# Patient Record
Sex: FEMALE | Race: WHITE | NOT HISPANIC OR LATINO | URBAN - METROPOLITAN AREA
[De-identification: names, ages, dates, MRNs, and addresses within clinical notes are randomized per-mention and may not be internally consistent; named-entity substitution may affect disease eponyms.]

---

## 2017-11-04 ENCOUNTER — OUTPATIENT (OUTPATIENT)
Dept: OUTPATIENT SERVICES | Facility: HOSPITAL | Age: 57
LOS: 1 days | Discharge: HOME | End: 2017-11-04

## 2017-11-04 DIAGNOSIS — Z12.31 ENCOUNTER FOR SCREENING MAMMOGRAM FOR MALIGNANT NEOPLASM OF BREAST: ICD-10-CM

## 2018-04-01 PROBLEM — Z00.00 ENCOUNTER FOR PREVENTIVE HEALTH EXAMINATION: Status: ACTIVE | Noted: 2018-04-01

## 2018-05-01 ENCOUNTER — LABORATORY RESULT (OUTPATIENT)
Age: 58
End: 2018-05-01

## 2018-05-01 ENCOUNTER — APPOINTMENT (OUTPATIENT)
Dept: OBGYN | Facility: CLINIC | Age: 58
End: 2018-05-01
Payer: COMMERCIAL

## 2018-05-01 ENCOUNTER — OUTPATIENT (OUTPATIENT)
Dept: OUTPATIENT SERVICES | Facility: HOSPITAL | Age: 58
LOS: 1 days | Discharge: HOME | End: 2018-05-01

## 2018-05-01 VITALS — HEIGHT: 63 IN | BODY MASS INDEX: 23.21 KG/M2 | WEIGHT: 131 LBS

## 2018-05-01 DIAGNOSIS — E10.39 TYPE 1 DIABETES MELLITUS WITH OTHER DIABETIC OPHTHALMIC COMPLICATION: ICD-10-CM

## 2018-05-01 PROCEDURE — 81003 URINALYSIS AUTO W/O SCOPE: CPT | Mod: QW

## 2018-05-01 PROCEDURE — 99396 PREV VISIT EST AGE 40-64: CPT

## 2018-05-05 PROBLEM — E10.39 TYPE 1 DIABETES MELLITUS WITH OTHER OPHTHALMIC COMPLICATION: Status: ACTIVE | Noted: 2018-05-05

## 2018-05-06 LAB
BILIRUB UR QL STRIP: NORMAL
GLUCOSE UR-MCNC: NORMAL
HCG UR QL: NORMAL EU/DL
HGB UR QL STRIP.AUTO: NORMAL
KETONES UR-MCNC: NORMAL
LEUKOCYTE ESTERASE UR QL STRIP: 25
NITRITE UR QL STRIP: NORMAL
PH UR STRIP: 7
PROT UR STRIP-MCNC: NORMAL
SP GR UR STRIP: 1

## 2018-05-08 DIAGNOSIS — Z01.419 ENCOUNTER FOR GYNECOLOGICAL EXAMINATION (GENERAL) (ROUTINE) WITHOUT ABNORMAL FINDINGS: ICD-10-CM

## 2018-06-05 ENCOUNTER — OUTPATIENT (OUTPATIENT)
Dept: OUTPATIENT SERVICES | Facility: HOSPITAL | Age: 58
LOS: 1 days | Discharge: HOME | End: 2018-06-05

## 2018-06-05 DIAGNOSIS — H90.3 SENSORINEURAL HEARING LOSS, BILATERAL: ICD-10-CM

## 2018-06-15 ENCOUNTER — FORM ENCOUNTER (OUTPATIENT)
Age: 58
End: 2018-06-15

## 2018-06-16 ENCOUNTER — OUTPATIENT (OUTPATIENT)
Dept: OUTPATIENT SERVICES | Facility: HOSPITAL | Age: 58
LOS: 1 days | Discharge: HOME | End: 2018-06-16

## 2018-06-18 DIAGNOSIS — M89.9 DISORDER OF BONE, UNSPECIFIED: ICD-10-CM

## 2018-06-18 DIAGNOSIS — Z13.820 ENCOUNTER FOR SCREENING FOR OSTEOPOROSIS: ICD-10-CM

## 2018-06-18 DIAGNOSIS — Z78.0 ASYMPTOMATIC MENOPAUSAL STATE: ICD-10-CM

## 2018-07-09 ENCOUNTER — APPOINTMENT (OUTPATIENT)
Dept: OTOLARYNGOLOGY | Facility: CLINIC | Age: 58
End: 2018-07-09
Payer: COMMERCIAL

## 2018-07-09 DIAGNOSIS — H91.90 UNSPECIFIED HEARING LOSS, UNSPECIFIED EAR: ICD-10-CM

## 2018-07-09 DIAGNOSIS — Z78.9 OTHER SPECIFIED HEALTH STATUS: ICD-10-CM

## 2018-07-09 PROCEDURE — 92550 TYMPANOMETRY & REFLEX THRESH: CPT

## 2018-07-09 PROCEDURE — 92557 COMPREHENSIVE HEARING TEST: CPT

## 2018-07-09 PROCEDURE — 99203 OFFICE O/P NEW LOW 30 MIN: CPT | Mod: 25

## 2018-07-09 RX ORDER — ESCITALOPRAM OXALATE 10 MG/1
10 TABLET, FILM COATED ORAL
Refills: 0 | Status: ACTIVE | COMMUNITY

## 2018-11-16 ENCOUNTER — FORM ENCOUNTER (OUTPATIENT)
Age: 58
End: 2018-11-16

## 2018-11-17 ENCOUNTER — OUTPATIENT (OUTPATIENT)
Dept: OUTPATIENT SERVICES | Facility: HOSPITAL | Age: 58
LOS: 1 days | Discharge: HOME | End: 2018-11-17

## 2018-11-17 DIAGNOSIS — Z12.31 ENCOUNTER FOR SCREENING MAMMOGRAM FOR MALIGNANT NEOPLASM OF BREAST: ICD-10-CM

## 2019-05-07 ENCOUNTER — LABORATORY RESULT (OUTPATIENT)
Age: 59
End: 2019-05-07

## 2019-05-07 ENCOUNTER — OUTPATIENT (OUTPATIENT)
Dept: OUTPATIENT SERVICES | Facility: HOSPITAL | Age: 59
LOS: 1 days | Discharge: HOME | End: 2019-05-07

## 2019-05-07 ENCOUNTER — APPOINTMENT (OUTPATIENT)
Dept: OBGYN | Facility: CLINIC | Age: 59
End: 2019-05-07
Payer: COMMERCIAL

## 2019-05-07 VITALS — WEIGHT: 130 LBS | HEIGHT: 63 IN | BODY MASS INDEX: 23.04 KG/M2

## 2019-05-07 LAB
BILIRUB UR QL STRIP: NORMAL
GLUCOSE UR-MCNC: NORMAL
HCG UR QL: NORMAL EU/DL
HGB UR QL STRIP.AUTO: NORMAL
KETONES UR-MCNC: NORMAL
LEUKOCYTE ESTERASE UR QL STRIP: 25
NITRITE UR QL STRIP: NORMAL
PH UR STRIP: 6.5
PROT UR STRIP-MCNC: NORMAL
SP GR UR STRIP: 1.01

## 2019-05-07 PROCEDURE — 99396 PREV VISIT EST AGE 40-64: CPT

## 2019-05-09 DIAGNOSIS — Z01.419 ENCOUNTER FOR GYNECOLOGICAL EXAMINATION (GENERAL) (ROUTINE) WITHOUT ABNORMAL FINDINGS: ICD-10-CM

## 2019-11-22 ENCOUNTER — FORM ENCOUNTER (OUTPATIENT)
Age: 59
End: 2019-11-22

## 2019-11-23 ENCOUNTER — OUTPATIENT (OUTPATIENT)
Dept: OUTPATIENT SERVICES | Facility: HOSPITAL | Age: 59
LOS: 1 days | Discharge: HOME | End: 2019-11-23
Payer: COMMERCIAL

## 2019-11-23 ENCOUNTER — OUTPATIENT (OUTPATIENT)
Dept: OUTPATIENT SERVICES | Facility: HOSPITAL | Age: 59
LOS: 1 days | Discharge: HOME | End: 2019-11-23

## 2019-11-23 DIAGNOSIS — Z12.31 ENCOUNTER FOR SCREENING MAMMOGRAM FOR MALIGNANT NEOPLASM OF BREAST: ICD-10-CM

## 2019-11-23 DIAGNOSIS — Z00.00 ENCOUNTER FOR GENERAL ADULT MEDICAL EXAMINATION WITHOUT ABNORMAL FINDINGS: ICD-10-CM

## 2019-11-23 PROCEDURE — 77067 SCR MAMMO BI INCL CAD: CPT | Mod: 26

## 2019-11-23 PROCEDURE — 77063 BREAST TOMOSYNTHESIS BI: CPT | Mod: 26

## 2020-09-07 ENCOUNTER — LABORATORY RESULT (OUTPATIENT)
Age: 60
End: 2020-09-07

## 2020-09-08 ENCOUNTER — APPOINTMENT (OUTPATIENT)
Dept: OBGYN | Facility: CLINIC | Age: 60
End: 2020-09-08
Payer: COMMERCIAL

## 2020-09-08 VITALS — HEIGHT: 63 IN | TEMPERATURE: 97.8 F | WEIGHT: 127 LBS | BODY MASS INDEX: 22.5 KG/M2

## 2020-09-08 LAB
BILIRUB UR QL STRIP: NORMAL
CLARITY UR: CLEAR
GLUCOSE UR-MCNC: NORMAL
HCG UR QL: 0.2 EU/DL
HGB UR QL STRIP.AUTO: NORMAL
KETONES UR-MCNC: NORMAL
LEUKOCYTE ESTERASE UR QL STRIP: NORMAL
NITRITE UR QL STRIP: NORMAL
PH UR STRIP: 6.5
PROT UR STRIP-MCNC: NORMAL
SP GR UR STRIP: 1

## 2020-09-08 PROCEDURE — 99396 PREV VISIT EST AGE 40-64: CPT

## 2020-09-08 PROCEDURE — 81003 URINALYSIS AUTO W/O SCOPE: CPT | Mod: QW

## 2020-11-25 ENCOUNTER — OUTPATIENT (OUTPATIENT)
Dept: OUTPATIENT SERVICES | Facility: HOSPITAL | Age: 60
LOS: 1 days | Discharge: HOME | End: 2020-11-25

## 2020-11-27 DIAGNOSIS — Z11.59 ENCOUNTER FOR SCREENING FOR OTHER VIRAL DISEASES: ICD-10-CM

## 2020-12-12 ENCOUNTER — OUTPATIENT (OUTPATIENT)
Dept: OUTPATIENT SERVICES | Facility: HOSPITAL | Age: 60
LOS: 1 days | Discharge: HOME | End: 2020-12-12
Payer: COMMERCIAL

## 2020-12-12 ENCOUNTER — RESULT REVIEW (OUTPATIENT)
Age: 60
End: 2020-12-12

## 2020-12-12 DIAGNOSIS — Z12.31 ENCOUNTER FOR SCREENING MAMMOGRAM FOR MALIGNANT NEOPLASM OF BREAST: ICD-10-CM

## 2020-12-12 PROCEDURE — 77067 SCR MAMMO BI INCL CAD: CPT | Mod: 26

## 2020-12-12 PROCEDURE — 77063 BREAST TOMOSYNTHESIS BI: CPT | Mod: 26

## 2020-12-14 DIAGNOSIS — M89.9 DISORDER OF BONE, UNSPECIFIED: ICD-10-CM

## 2020-12-14 DIAGNOSIS — Z78.0 ASYMPTOMATIC MENOPAUSAL STATE: ICD-10-CM

## 2020-12-14 DIAGNOSIS — Z13.820 ENCOUNTER FOR SCREENING FOR OSTEOPOROSIS: ICD-10-CM

## 2021-01-27 ENCOUNTER — OUTPATIENT (OUTPATIENT)
Dept: OUTPATIENT SERVICES | Facility: HOSPITAL | Age: 61
LOS: 1 days | Discharge: HOME | End: 2021-01-27

## 2021-01-28 DIAGNOSIS — Z11.59 ENCOUNTER FOR SCREENING FOR OTHER VIRAL DISEASES: ICD-10-CM

## 2021-02-06 ENCOUNTER — OUTPATIENT (OUTPATIENT)
Dept: OUTPATIENT SERVICES | Facility: HOSPITAL | Age: 61
LOS: 1 days | Discharge: HOME | End: 2021-02-06

## 2021-02-06 DIAGNOSIS — Z11.59 ENCOUNTER FOR SCREENING FOR OTHER VIRAL DISEASES: ICD-10-CM

## 2021-04-13 ENCOUNTER — APPOINTMENT (OUTPATIENT)
Dept: OBGYN | Facility: CLINIC | Age: 61
End: 2021-04-13

## 2021-04-22 ENCOUNTER — NON-APPOINTMENT (OUTPATIENT)
Age: 61
End: 2021-04-22

## 2021-06-07 ENCOUNTER — APPOINTMENT (OUTPATIENT)
Dept: OBGYN | Facility: CLINIC | Age: 61
End: 2021-06-07

## 2021-08-23 ENCOUNTER — APPOINTMENT (OUTPATIENT)
Dept: OTOLARYNGOLOGY | Facility: CLINIC | Age: 61
End: 2021-08-23
Payer: COMMERCIAL

## 2021-08-23 DIAGNOSIS — J35.8 OTHER CHRONIC DISEASES OF TONSILS AND ADENOIDS: ICD-10-CM

## 2021-08-23 PROCEDURE — 31575 DIAGNOSTIC LARYNGOSCOPY: CPT

## 2021-08-23 PROCEDURE — 99213 OFFICE O/P EST LOW 20 MIN: CPT | Mod: 25

## 2021-08-23 NOTE — PHYSICAL EXAM
[Normal] : mucosa is normal [Midline] : trachea located in midline position [de-identified] : ~0.5 cm left tonsilar mass

## 2021-08-23 NOTE — HISTORY OF PRESENT ILLNESS
[de-identified] : 57 Year old patient is here today for hearing aid clearance. Patient complains of hearing loss bilaterally that has been present for years. She recently had an audiogram completed which states she requires hearing aids. She is otherwise well and denies otalgia and/or otorrhea.  [FreeTextEntry1] : \par 8/23/21: Patient presents today with c/o left tonsillar mass. Patient states she felt a lump on the left side of her neck. Seen by primary today told to have tonsillar mass. Patient denies any sore throat. No dysphagia. No choking. Unsure of snoring at night.

## 2021-09-09 ENCOUNTER — OUTPATIENT (OUTPATIENT)
Dept: OUTPATIENT SERVICES | Facility: HOSPITAL | Age: 61
LOS: 1 days | Discharge: HOME | End: 2021-09-09
Payer: COMMERCIAL

## 2021-09-09 ENCOUNTER — RESULT REVIEW (OUTPATIENT)
Age: 61
End: 2021-09-09

## 2021-09-09 DIAGNOSIS — J35.8 OTHER CHRONIC DISEASES OF TONSILS AND ADENOIDS: ICD-10-CM

## 2021-09-09 PROCEDURE — 70543 MRI ORBT/FAC/NCK W/O &W/DYE: CPT | Mod: 26

## 2021-09-14 ENCOUNTER — TRANSCRIPTION ENCOUNTER (OUTPATIENT)
Age: 61
End: 2021-09-14

## 2021-09-24 ENCOUNTER — APPOINTMENT (OUTPATIENT)
Dept: OPHTHALMOLOGY | Facility: CLINIC | Age: 61
End: 2021-09-24

## 2021-09-24 ENCOUNTER — OUTPATIENT (OUTPATIENT)
Dept: OUTPATIENT SERVICES | Facility: HOSPITAL | Age: 61
LOS: 1 days | Discharge: HOME | End: 2021-09-24
Payer: COMMERCIAL

## 2021-09-24 DIAGNOSIS — H04.129 DRY EYE SYNDROME OF UNSPECIFIED LACRIMAL GLAND: ICD-10-CM

## 2021-09-24 PROCEDURE — 92004 COMPRE OPH EXAM NEW PT 1/>: CPT

## 2021-10-13 ENCOUNTER — APPOINTMENT (OUTPATIENT)
Dept: OTOLARYNGOLOGY | Facility: CLINIC | Age: 61
End: 2021-10-13

## 2021-12-14 ENCOUNTER — RESULT REVIEW (OUTPATIENT)
Age: 61
End: 2021-12-14

## 2021-12-14 ENCOUNTER — OUTPATIENT (OUTPATIENT)
Dept: OUTPATIENT SERVICES | Facility: HOSPITAL | Age: 61
LOS: 1 days | Discharge: HOME | End: 2021-12-14
Payer: COMMERCIAL

## 2021-12-14 DIAGNOSIS — Z12.31 ENCOUNTER FOR SCREENING MAMMOGRAM FOR MALIGNANT NEOPLASM OF BREAST: ICD-10-CM

## 2021-12-14 PROCEDURE — 77067 SCR MAMMO BI INCL CAD: CPT | Mod: 26

## 2021-12-14 PROCEDURE — 77063 BREAST TOMOSYNTHESIS BI: CPT | Mod: 26

## 2022-02-28 ENCOUNTER — LABORATORY RESULT (OUTPATIENT)
Age: 62
End: 2022-02-28

## 2022-03-01 ENCOUNTER — APPOINTMENT (OUTPATIENT)
Dept: OBGYN | Facility: CLINIC | Age: 62
End: 2022-03-01
Payer: COMMERCIAL

## 2022-03-01 VITALS — WEIGHT: 126 LBS | BODY MASS INDEX: 22.32 KG/M2 | HEIGHT: 63 IN | TEMPERATURE: 97 F

## 2022-03-01 DIAGNOSIS — N94.10 UNSPECIFIED DYSPAREUNIA: ICD-10-CM

## 2022-03-01 PROCEDURE — 99396 PREV VISIT EST AGE 40-64: CPT

## 2022-04-29 ENCOUNTER — APPOINTMENT (OUTPATIENT)
Dept: OTOLARYNGOLOGY | Facility: CLINIC | Age: 62
End: 2022-04-29
Payer: COMMERCIAL

## 2022-04-29 DIAGNOSIS — H91.93 UNSPECIFIED HEARING LOSS, BILATERAL: ICD-10-CM

## 2022-04-29 DIAGNOSIS — T16.9XXA FOREIGN BODY IN EAR, UNSPECIFIED EAR, INITIAL ENCOUNTER: ICD-10-CM

## 2022-04-29 PROCEDURE — 69200 CLEAR OUTER EAR CANAL: CPT

## 2022-04-29 PROCEDURE — 99213 OFFICE O/P EST LOW 20 MIN: CPT | Mod: 25

## 2022-04-29 PROCEDURE — 92557 COMPREHENSIVE HEARING TEST: CPT

## 2022-04-29 PROCEDURE — 92550 TYMPANOMETRY & REFLEX THRESH: CPT

## 2022-04-29 NOTE — HISTORY OF PRESENT ILLNESS
[FreeTextEntry1] : Patient presents today following up on foreign body in left ear.   Has a  piece of  hearing aid stuck in left .  Has been in ear since April 7th. No ear pain.

## 2022-04-29 NOTE — ASSESSMENT
[FreeTextEntry1] : Foreign body removed.\par \par I reviewed, interpreted, and discussed the Audiogram done today. Bilateral SNHL.\par

## 2022-04-29 NOTE — PHYSICAL EXAM
[Normal] : mucosa is normal [Midline] : trachea located in midline position [de-identified] : fb left EAC, consistent with hearing aid piece, removed uneventfully with alligator forceps.  [de-identified] : post pharngeal lesion, smooth in appearance above left tonsil approx 5 mm x 5 mm

## 2022-12-17 ENCOUNTER — RESULT REVIEW (OUTPATIENT)
Age: 62
End: 2022-12-17

## 2022-12-17 ENCOUNTER — OUTPATIENT (OUTPATIENT)
Dept: OUTPATIENT SERVICES | Facility: HOSPITAL | Age: 62
LOS: 1 days | Discharge: HOME | End: 2022-12-17

## 2022-12-17 DIAGNOSIS — Z12.31 ENCOUNTER FOR SCREENING MAMMOGRAM FOR MALIGNANT NEOPLASM OF BREAST: ICD-10-CM

## 2022-12-17 PROCEDURE — 77067 SCR MAMMO BI INCL CAD: CPT | Mod: 26

## 2022-12-17 PROCEDURE — 77063 BREAST TOMOSYNTHESIS BI: CPT | Mod: 26

## 2023-03-06 ENCOUNTER — LABORATORY RESULT (OUTPATIENT)
Age: 63
End: 2023-03-06

## 2023-03-07 ENCOUNTER — APPOINTMENT (OUTPATIENT)
Dept: OBGYN | Facility: CLINIC | Age: 63
End: 2023-03-07
Payer: COMMERCIAL

## 2023-03-07 VITALS — HEIGHT: 63 IN | WEIGHT: 125 LBS | TEMPERATURE: 98.7 F | BODY MASS INDEX: 22.15 KG/M2

## 2023-03-07 PROCEDURE — 99396 PREV VISIT EST AGE 40-64: CPT

## 2024-01-06 ENCOUNTER — RESULT REVIEW (OUTPATIENT)
Age: 64
End: 2024-01-06

## 2024-01-06 ENCOUNTER — OUTPATIENT (OUTPATIENT)
Dept: OUTPATIENT SERVICES | Facility: HOSPITAL | Age: 64
LOS: 1 days | End: 2024-01-06
Payer: COMMERCIAL

## 2024-01-06 DIAGNOSIS — Z12.31 ENCOUNTER FOR SCREENING MAMMOGRAM FOR MALIGNANT NEOPLASM OF BREAST: ICD-10-CM

## 2024-01-06 PROCEDURE — 77063 BREAST TOMOSYNTHESIS BI: CPT | Mod: 26

## 2024-01-06 PROCEDURE — 77067 SCR MAMMO BI INCL CAD: CPT | Mod: 26

## 2024-01-06 PROCEDURE — 77067 SCR MAMMO BI INCL CAD: CPT

## 2024-01-06 PROCEDURE — 77063 BREAST TOMOSYNTHESIS BI: CPT

## 2024-01-07 DIAGNOSIS — Z12.31 ENCOUNTER FOR SCREENING MAMMOGRAM FOR MALIGNANT NEOPLASM OF BREAST: ICD-10-CM

## 2024-04-09 ENCOUNTER — LABORATORY RESULT (OUTPATIENT)
Age: 64
End: 2024-04-09

## 2024-04-10 ENCOUNTER — APPOINTMENT (OUTPATIENT)
Dept: OBGYN | Facility: CLINIC | Age: 64
End: 2024-04-10
Payer: COMMERCIAL

## 2024-04-10 VITALS — TEMPERATURE: 98.1 F | WEIGHT: 128 LBS | BODY MASS INDEX: 22.68 KG/M2 | HEIGHT: 63 IN

## 2024-04-10 DIAGNOSIS — Z78.0 ASYMPTOMATIC MENOPAUSAL STATE: ICD-10-CM

## 2024-04-10 DIAGNOSIS — N81.6 RECTOCELE: ICD-10-CM

## 2024-04-10 DIAGNOSIS — M85.80 OTHER SPECIFIED DISORDERS OF BONE DENSITY AND STRUCTURE, UNSPECIFIED SITE: ICD-10-CM

## 2024-04-10 DIAGNOSIS — Z01.419 ENCOUNTER FOR GYNECOLOGICAL EXAMINATION (GENERAL) (ROUTINE) W/OUT ABNORMAL FINDINGS: ICD-10-CM

## 2024-04-10 DIAGNOSIS — F41.9 ANXIETY DISORDER, UNSPECIFIED: ICD-10-CM

## 2024-04-10 PROCEDURE — 99396 PREV VISIT EST AGE 40-64: CPT

## 2024-04-13 PROBLEM — F41.9 ANXIETY: Status: ACTIVE | Noted: 2023-03-09

## 2024-04-13 PROBLEM — Z01.419 WELL WOMAN EXAM WITH ROUTINE GYNECOLOGICAL EXAM: Status: ACTIVE | Noted: 2019-05-12

## 2024-04-13 PROBLEM — N81.6 RECTOCELE, FEMALE: Status: ACTIVE | Noted: 2022-03-01

## 2024-04-13 PROBLEM — M85.80 OSTEOPENIA: Status: ACTIVE | Noted: 2019-05-12

## 2024-04-13 PROBLEM — Z78.0 MENOPAUSE: Status: ACTIVE | Noted: 2018-05-05

## 2024-04-13 NOTE — DISCUSSION/SUMMARY
[FreeTextEntry1] : 63 YEAR OLD FEMALE LMP 2010 PRESENTS FOR WELL WOMAN GYNECOLOGIC EXAMINATION AND PAP .LAST SEEN FOR VISIT3/7/2023;  NO NEW MEDICAL OR SURGICAL HISTORY SINCE LAST VISIT. MEDICATIONS; LEXAPRO 10 MG FOR ANXIETY MEDICATION ALLERGIES; NONE ROS; BMS-NORMAL; NO FAM HISTORY OF COLON CANCER; LAST COLONOSCOPY 7-8 YRS AGO           NO URIANRY COMPLAINTS           NOT SEXUALLY ACTIVE BREASTS; NOT DOING BREAST SELF EXAMINATION                    LAST MAMOGRPAHY WAS DONE 1/6/2024 B[facility] II                     PATEERNAL AUNT WITH HISTORY OF BREAST CANCER +EXERCISE; + MULTIVITAIMNS; + YOGURT AND CHEESE; - TOBACCO OR VAPING FRACTURE HISTORY;NONE NO FAM HISTORY OF OSTEOPOROSIS LAST BONE DENSITY TESTING DONE 12/12/2020 WITH OSTEOPENIA  PE;/70 TEMP 98.1 WEIGHT 128 LBS ;HEIGHT 5'3"      BREASTS;' NO MASSES ; NO DISCHARGES      ABDOMEN;SOFT, NO MASSES; NO TENDERNESS TO PALPATION      PELVIC NORMAL EXTERNAL GENITALIA                    NORMAL VAGINA WITHOUT LESION                    1ST DEGREE RECTOCELE                    NORMAL CERVIX WITHOUT LESION                    3RD DEGREE RETROVERTED UTERUS ON BIMANUAL EXAMINATION                    NO PALPABLE ADNEXAL MASSES  IMP; WELL WOMAN          MENOPAUSE          OSTEOPENIA          RECTOCELE  PLAN; THIN PREP PAP WITH HR HPV TESTING DONE-PT TO CALL IN 2 WKS FOR RESULTS             BREAST SELF EXAMINATION MONTHLY CONTINUED TO BE STRONGLY ADVISED             ANNUAL SCREENIGN MAMMMOGRPAHY ADVSIED AND TO BE DONE 1/2025 -SCRIPT GIVEN             BONE DENSITY TESTING ADVISED AND TO BE SCHEDULED.

## 2024-06-04 ENCOUNTER — APPOINTMENT (OUTPATIENT)
Dept: OBGYN | Facility: CLINIC | Age: 64
End: 2024-06-04

## 2024-06-04 PROCEDURE — 77080 DXA BONE DENSITY AXIAL: CPT

## 2024-08-19 ENCOUNTER — OUTPATIENT (OUTPATIENT)
Dept: OUTPATIENT SERVICES | Facility: HOSPITAL | Age: 64
LOS: 1 days | End: 2024-08-19
Payer: COMMERCIAL

## 2024-08-19 DIAGNOSIS — Z00.8 ENCOUNTER FOR OTHER GENERAL EXAMINATION: ICD-10-CM

## 2024-08-19 DIAGNOSIS — E07.9 DISORDER OF THYROID, UNSPECIFIED: ICD-10-CM

## 2024-08-19 PROCEDURE — 76536 US EXAM OF HEAD AND NECK: CPT

## 2024-08-19 PROCEDURE — 76536 US EXAM OF HEAD AND NECK: CPT | Mod: 26

## 2024-08-20 DIAGNOSIS — E07.9 DISORDER OF THYROID, UNSPECIFIED: ICD-10-CM

## 2024-10-02 ENCOUNTER — APPOINTMENT (OUTPATIENT)
Dept: OTOLARYNGOLOGY | Facility: CLINIC | Age: 64
End: 2024-10-02

## 2024-10-02 PROCEDURE — 99214 OFFICE O/P EST MOD 30 MIN: CPT

## 2024-10-02 NOTE — HISTORY OF PRESENT ILLNESS
[de-identified] :    Referred  by Dr Palafox-she presents with complaints of   multiple  thyroid nodules  .   The patient   was noted to have   thyroid nodule , up to 1.1 cm on left on US and US guided fna  + papillary thyroid CA  Denies FH thyroid cancer, RT exposure, hoarseness or dysphagia.

## 2024-10-02 NOTE — ASSESSMENT
[FreeTextEntry1] : I have discussed the clinical implications of my findings with  the patient. At this time the patient wishes to proceed with surgery- total  thyroidectomy  and central compartment LN dissection.  The goals of the procedure, operative plan, alternatives including nonsurgical treatment and risks which include but are not limited to  anesthetic risk, bleeding, infection,  aesthetic deformity, numbness of skin,  chronic swallowing and voice problems, recurrent laryngeal nerve injury, superior laryngeal nerve injury, need for further surgery, need for further treatment, permanent hypoparathyroidism,  , chyle fistula were all explained to the patient who appears to understand  and wishes to proceed.    All questions are answered.  Accordingly they will be scheduled for total  thyroidectomy  and central compartment LN dissection The patient will follow up with me sooner PRN any new, persistent or worsening symptoms.     45 min spent counseling and coordinating care [ family member bariatric surgeona nd breast surgeon at [facility]]

## 2024-10-02 NOTE — PHYSICAL EXAM
[de-identified] : 1 cm left thyroid nodule [Normal] : mucosa is normal [Midline] : trachea located in midline position

## 2024-10-28 ENCOUNTER — OUTPATIENT (OUTPATIENT)
Dept: OUTPATIENT SERVICES | Facility: HOSPITAL | Age: 64
LOS: 1 days | End: 2024-10-28
Payer: COMMERCIAL

## 2024-10-28 VITALS
TEMPERATURE: 98 F | SYSTOLIC BLOOD PRESSURE: 121 MMHG | HEART RATE: 54 BPM | WEIGHT: 128.09 LBS | RESPIRATION RATE: 16 BRPM | DIASTOLIC BLOOD PRESSURE: 79 MMHG | OXYGEN SATURATION: 100 % | HEIGHT: 63 IN

## 2024-10-28 DIAGNOSIS — C73 MALIGNANT NEOPLASM OF THYROID GLAND: ICD-10-CM

## 2024-10-28 DIAGNOSIS — Z98.51 TUBAL LIGATION STATUS: Chronic | ICD-10-CM

## 2024-10-28 DIAGNOSIS — Z98.890 OTHER SPECIFIED POSTPROCEDURAL STATES: Chronic | ICD-10-CM

## 2024-10-28 DIAGNOSIS — Z01.818 ENCOUNTER FOR OTHER PREPROCEDURAL EXAMINATION: ICD-10-CM

## 2024-10-28 LAB
ALBUMIN SERPL ELPH-MCNC: 4.7 G/DL — SIGNIFICANT CHANGE UP (ref 3.5–5.2)
ALP SERPL-CCNC: 46 U/L — SIGNIFICANT CHANGE UP (ref 30–115)
ALT FLD-CCNC: 14 U/L — SIGNIFICANT CHANGE UP (ref 0–41)
ANION GAP SERPL CALC-SCNC: 11 MMOL/L — SIGNIFICANT CHANGE UP (ref 7–14)
APTT BLD: 29.8 SEC — SIGNIFICANT CHANGE UP (ref 27–39.2)
AST SERPL-CCNC: 19 U/L — SIGNIFICANT CHANGE UP (ref 0–41)
BASOPHILS # BLD AUTO: 0.03 K/UL — SIGNIFICANT CHANGE UP (ref 0–0.2)
BASOPHILS NFR BLD AUTO: 0.6 % — SIGNIFICANT CHANGE UP (ref 0–1)
BILIRUB SERPL-MCNC: 0.3 MG/DL — SIGNIFICANT CHANGE UP (ref 0.2–1.2)
BUN SERPL-MCNC: 12 MG/DL — SIGNIFICANT CHANGE UP (ref 10–20)
CALCIUM SERPL-MCNC: 9.7 MG/DL — SIGNIFICANT CHANGE UP (ref 8.4–10.5)
CHLORIDE SERPL-SCNC: 103 MMOL/L — SIGNIFICANT CHANGE UP (ref 98–110)
CO2 SERPL-SCNC: 25 MMOL/L — SIGNIFICANT CHANGE UP (ref 17–32)
CREAT SERPL-MCNC: 0.7 MG/DL — SIGNIFICANT CHANGE UP (ref 0.7–1.5)
EGFR: 97 ML/MIN/1.73M2 — SIGNIFICANT CHANGE UP
EOSINOPHIL # BLD AUTO: 0.12 K/UL — SIGNIFICANT CHANGE UP (ref 0–0.7)
EOSINOPHIL NFR BLD AUTO: 2.4 % — SIGNIFICANT CHANGE UP (ref 0–8)
GLUCOSE SERPL-MCNC: 105 MG/DL — HIGH (ref 70–99)
HCT VFR BLD CALC: 40.2 % — SIGNIFICANT CHANGE UP (ref 37–47)
HGB BLD-MCNC: 13.4 G/DL — SIGNIFICANT CHANGE UP (ref 12–16)
IMM GRANULOCYTES NFR BLD AUTO: 0.2 % — SIGNIFICANT CHANGE UP (ref 0.1–0.3)
INR BLD: 0.9 RATIO — SIGNIFICANT CHANGE UP (ref 0.65–1.3)
LYMPHOCYTES # BLD AUTO: 2.07 K/UL — SIGNIFICANT CHANGE UP (ref 1.2–3.4)
LYMPHOCYTES # BLD AUTO: 41.3 % — SIGNIFICANT CHANGE UP (ref 20.5–51.1)
MCHC RBC-ENTMCNC: 31.2 PG — HIGH (ref 27–31)
MCHC RBC-ENTMCNC: 33.3 G/DL — SIGNIFICANT CHANGE UP (ref 32–37)
MCV RBC AUTO: 93.7 FL — SIGNIFICANT CHANGE UP (ref 81–99)
MONOCYTES # BLD AUTO: 0.3 K/UL — SIGNIFICANT CHANGE UP (ref 0.1–0.6)
MONOCYTES NFR BLD AUTO: 6 % — SIGNIFICANT CHANGE UP (ref 1.7–9.3)
NEUTROPHILS # BLD AUTO: 2.48 K/UL — SIGNIFICANT CHANGE UP (ref 1.4–6.5)
NEUTROPHILS NFR BLD AUTO: 49.5 % — SIGNIFICANT CHANGE UP (ref 42.2–75.2)
NRBC # BLD: 0 /100 WBCS — SIGNIFICANT CHANGE UP (ref 0–0)
PLATELET # BLD AUTO: 288 K/UL — SIGNIFICANT CHANGE UP (ref 130–400)
PMV BLD: 9.2 FL — SIGNIFICANT CHANGE UP (ref 7.4–10.4)
POTASSIUM SERPL-MCNC: 4.1 MMOL/L — SIGNIFICANT CHANGE UP (ref 3.5–5)
POTASSIUM SERPL-SCNC: 4.1 MMOL/L — SIGNIFICANT CHANGE UP (ref 3.5–5)
PROT SERPL-MCNC: 7.1 G/DL — SIGNIFICANT CHANGE UP (ref 6–8)
PROTHROM AB SERPL-ACNC: 10.3 SEC — SIGNIFICANT CHANGE UP (ref 9.95–12.87)
RBC # BLD: 4.29 M/UL — SIGNIFICANT CHANGE UP (ref 4.2–5.4)
RBC # FLD: 13 % — SIGNIFICANT CHANGE UP (ref 11.5–14.5)
SODIUM SERPL-SCNC: 139 MMOL/L — SIGNIFICANT CHANGE UP (ref 135–146)
WBC # BLD: 5.01 K/UL — SIGNIFICANT CHANGE UP (ref 4.8–10.8)
WBC # FLD AUTO: 5.01 K/UL — SIGNIFICANT CHANGE UP (ref 4.8–10.8)

## 2024-10-28 PROCEDURE — 36415 COLL VENOUS BLD VENIPUNCTURE: CPT

## 2024-10-28 PROCEDURE — 85025 COMPLETE CBC W/AUTO DIFF WBC: CPT

## 2024-10-28 PROCEDURE — 85610 PROTHROMBIN TIME: CPT

## 2024-10-28 PROCEDURE — 93010 ELECTROCARDIOGRAM REPORT: CPT

## 2024-10-28 PROCEDURE — 99214 OFFICE O/P EST MOD 30 MIN: CPT | Mod: 25

## 2024-10-28 PROCEDURE — 93005 ELECTROCARDIOGRAM TRACING: CPT

## 2024-10-28 PROCEDURE — 85730 THROMBOPLASTIN TIME PARTIAL: CPT

## 2024-10-28 PROCEDURE — 80053 COMPREHEN METABOLIC PANEL: CPT

## 2024-10-28 NOTE — H&P PST ADULT - NSICDXPASTMEDICALHX_GEN_ALL_CORE_FT
PAST MEDICAL HISTORY:  Adhesive capsulitis of shoulder     H/O: depression     Hearing loss     Papillary thyroid carcinoma     Thyroid nodule     Uses hearing aid

## 2024-10-28 NOTE — H&P PST ADULT - REASON FOR ADMISSION
Case Type: OP Block TimeSuite: OR NorthProceduralist: Dennis Corbett  Confirmed Surgery DateTime: 11- - 0:00PAST DateTime: 10- - 8:30Procedure: TOTAL THYROIDECTOMY WITH CENTRAL COMPARTMENT LYMPH NODE DISSECTION  ERP?: NoLaterality: BilateralLength of Procedure: 120 Minutes  Anesthesia Type: General

## 2024-10-28 NOTE — H&P PST ADULT - NSICDXFAMILYHX_GEN_ALL_CORE_FT
FAMILY HISTORY:  Father  Still living? No  FH: Parkinson's disease, Age at diagnosis: Age Unknown    Mother  Still living? Yes, Estimated age: Age Unknown  FH: HTN (hypertension), Age at diagnosis: Age Unknown    Aunt  Still living? No  FH: breast cancer, Age at diagnosis: Age Unknown  FH: uterine cancer, Age at diagnosis: Age Unknown

## 2024-10-28 NOTE — H&P PST ADULT - HISTORY OF PRESENT ILLNESS
64y Female presents today for presurgical testing for TOTAL THYROIDECTOMY WITH CENTRAL COMPARTMENT LYMPH NODE DISSECTION. Per [place] note dated 10/2/24 " Referred by Dr Palafox-she presents with complaints of multiple thyroid nodules .  ?  The patient was noted to have thyroid nodule , up to 1.1 cm on left on US and US guided fna + papillary thyroid CA  ?  Denies FH thyroid cancer, RT exposure, hoarseness or dysphagia....  I have discussed the clinical implications of my findings with the patient. At this time the patient wishes to proceed with surgery- total thyroidectomy and central compartment LN dissection."  Patient states above is true and accurate. She denies pain and offers no other complaints at this time, now for scheduled procedure.  Patient/guardian denies any CP, palpitations, SOB, cough, or dysuria. No recent URI or UTI.  Stated exercise tolerance is FOS >4; walks minimum 3 miles 3 days a week  CLARITA screen reviewed    Patient/guardian denies any recent personal exposure to COVID19. Denies any sick contacts. Patient/guardian denies travel within the past 30 days. Patient was instructed to quarantine until after procedure.    Anesthesia Alert  NO--Difficult Airway - class I  NO--History of neck surgery or radiation  NO--Limited ROM of neck  NO--History of Malignant hyperthermia  NO--Personal or family history of Pseudocholinesterase deficiency.  NO--Prior Anesthesia Complication  NO--Latex Allergy  NO--Loose teeth  NO--History of Rheumatoid Arthritis  NO--CLARITA  NO--Bleeding risk  NO--Other_____    [employer] Activity Status Index (DASI) from [email or web address]  on 10/28/2024  ** All calculations should be rechecked by clinician prior to use **    RESULT SUMMARY:  58.2 points  The higher the score (maximum 58.2), the higher the functional status.    9.89 METs        INPUTS:  Take care of self —> 2.75 = Yes  Walk indoors —> 1.75 = Yes  Walk 1&ndash;2 blocks on level ground —> 2.75 = Yes  Climb a flight of stairs or walk up a hill —> 5.5 = Yes  Run a short distance —> 8 = Yes  Do light work around the house —> 2.7 = Yes  Do moderate work around the house —> 3.5 = Yes  Do heavy work around the house —> 8 = Yes  Do yardwork —> 4.5 = Yes  Have sexual relations —> 5.25 = Yes  Participate in moderate recreational activities —> 6 = Yes  Participate in strenuous sports —> 7.5 = Yes    Patient/guardian states that this is their complete medical history and list of medications.  Patient/guardian understands instructions given during this visit and was given the opportunity to ask questions and have them answered. They were instructed to follow up with their surgeon/surgeon's office with any questions regarding their procedure.

## 2024-10-29 DIAGNOSIS — Z01.818 ENCOUNTER FOR OTHER PREPROCEDURAL EXAMINATION: ICD-10-CM

## 2024-10-29 DIAGNOSIS — C73 MALIGNANT NEOPLASM OF THYROID GLAND: ICD-10-CM

## 2024-11-11 NOTE — ASU PATIENT PROFILE, ADULT - FALL HARM RISK - UNIVERSAL INTERVENTIONS
Bed in lowest position, wheels locked, appropriate side rails in place/Call bell, personal items and telephone in reach/Instruct patient to call for assistance before getting out of bed or chair/Non-slip footwear when patient is out of bed/[place] to call system/Physically safe environment - no spills, clutter or unnecessary equipment/Purposeful Proactive Rounding/Room/bathroom lighting operational, light cord in reach

## 2024-11-11 NOTE — ASU PATIENT PROFILE, ADULT - ACCEPTABLE
[facility] - Emergency Dept  Hospital Medicine  History & Physical    Patient Name: Alexa Otero  MRN: [id number]  Patient Class: IP- Inpatient  Admission Date: 10/7/2024  Attending Physician: Jaimie Marcum MD   Primary Care Provider: Idalia Greco MD         Patient information was obtained from patient and ER records.     Subjective:     Principal Problem:Bilateral pneumonia    Chief Complaint:   Chief Complaint   Patient presents with    Abdominal Pain    Tachycardia     Patient brought in by EMS. Patient was at clinic for abd pain N/V/D x roughly 3 days. Upon nurses assessment at clinic patiets HR elevated and patient in A-fib RVR  On monitor here and with EMS patient shows A-fib RVR        HPI: 83-year-old female who presented to the ED from outpatient clinic via EMS with reports of abdominal pain, N/V/D x3 days found to be in atrial fib with RVR.  Patient reports that she has stage IV lung cancer and went to her clinic for follow up.  She states that she was having and has had severe abdominal pain with NVD since her last chemotherapy.  Patient denies any feelings of chest palpitations.  Had discussion with patient regarding being placed on a ventilator or having chest compressions. She stated that she did not want to have those things done and she was made a DNR in the system.     CTA chest postsurgical changes of right lower lobe lobectomy.  Loculated right pleural effusion similar to prior.  New left pleural effusion and worsening diffuse bilateral airspace opacities interlobular septal thickening concerning for progression of malignancy or superimposed pneumonia.  New mesenteric stranding worse along the ascending colon.  Could be volume overload or infectious/inflammatory colitis.  Severe aortic atheromatous plaque.  Blood work performed in ED BUN 28, WBCs 3.29, hemoglobin 10.1, hematocrit 33.8, platelets 171, .  Initial troponin 13.3    Past Medical History:   Diagnosis Date     Arthritis     Cardiac arrest 10/2023    Cataract     Chronic obstructive pulmonary disease, unspecified COPD type 3/20/2024    Colon polyp     Coronary artery disease 3/20/2024    Diabetes mellitus type II 2012    Encounter for blood transfusion     Extra-ovarian endometrioid adenocarcinoma 2020    GERD (gastroesophageal reflux disease)     History of chronic cough     clear productive    Hyperlipidemia     Hypertension     Macular degeneration     Ovarian cancer     PONV (postoperative nausea and vomiting)     Squamous cell carcinoma 1980's    precancer of cervix       Past Surgical History:   Procedure Laterality Date    AUGMENTATION OF BREAST      BRONCHOSCOPY N/A 12/12/2023    Procedure: BRONCHOSCOPY;  Surgeon: Neva Christian MD;  Location: [facility];  Service: ENT;  Laterality: N/A;    BRONCHOSCOPY WITH FLUOROSCOPY Right 05/11/2023    Procedure: BRONCHOSCOPY, WITH FLUOROSCOPY;  Surgeon: Neva Christian MD;  Location: [facility] ENDO;  Service: Pulmonary;  Laterality: Right;    BRONCHOSCOPY WITH FLUOROSCOPY N/A 12/15/2023    Procedure: BRONCHOSCOPY, WITH FLUOROSCOPY;  Surgeon: Neva Christian MD;  Location: [facility] ENDO;  Service: Pulmonary;  Laterality: N/A;    CATARACT EXTRACTION BILATERAL W/ ANTERIOR VITRECTOMY Bilateral     CHOLECYSTECTOMY      COLONOSCOPY      COLONOSCOPY N/A 04/20/2023    Procedure: COLONOSCOPY;  Surgeon: Sabino Stephenson MD;  Location: [facility];  Service: Endoscopy;  Laterality: N/A;    CORONARY STENT PLACEMENT  10/2023    x 3    ESOPHAGOGASTRODUODENOSCOPY N/A 06/20/2018    Procedure: EGD (ESOPHAGOGASTRODUODENOSCOPY);  Surgeon: Sabino Stephenson MD;  Location: [facility];  Service: Endoscopy;  Laterality: N/A;    ESOPHAGOGASTRODUODENOSCOPY N/A 03/31/2023    Procedure: EGD (ESOPHAGOGASTRODUODENOSCOPY);  Surgeon: Sabino Stephenson MD;  Location: [facility];  Service: Endoscopy;  Laterality: N/A;    ESOPHAGOGASTRODUODENOSCOPY N/A 12/11/2023    Procedure: EGD (ESOPHAGOGASTRODUODENOSCOPY);   Surgeon: Pretty Salgado MD;  Location: [facility] ENDO;  Service: Endoscopy;  Laterality: N/A;    HYSTERECTOMY  1976    partial    INJECTION OF ANESTHETIC AGENT AROUND MEDIAL BRANCH NERVES INNERVATING LUMBAR FACET JOINT Right 05/10/2023    Procedure: Block-nerve-Lateral-branch-lumbar;  Surgeon: Agustin Robles MD;  Location: [facility] OR;  Service: Pain Management;  Laterality: Right;  L5 and s1,s2 LBB    INJECTION OF ANESTHETIC AGENT AROUND MEDIAL BRANCH NERVES INNERVATING LUMBAR FACET JOINT Right 05/30/2023    Procedure: Block-nerve-medial branch-lumbar;  Surgeon: Agustin Robles MD;  Location: [facility] OR;  Service: Pain Management;  Laterality: Right;  L5, s1 ,s2 LBB #2    INJECTION OF ANESTHETIC AGENT AROUND NERVE Right 10/31/2023    Procedure: INTERCOSTAL NERVE BLOCK;  Surgeon: Washington Shankar MD;  Location: [facility] OR;  Service: Cardiothoracic;  Laterality: Right;    INJECTION, SACROILIAC JOINT Right 01/26/2023    Procedure: INJECTION,SACROILIAC JOINT;  Surgeon: Agustin Robles MD;  Location: [facility] OR;  Service: Pain Management;  Laterality: Right;    INSERTION OF TUNNELED CENTRAL VENOUS CATHETER (CVC) WITH SUBCUTANEOUS PORT Right 10/19/2020    Procedure: INSERTION, PORT-A-CATH;  Surgeon: Misti Mcfarland MD;  Location: [facility] OR;  Service: General;  Laterality: Right;    INTRAMEDULLARY RODDING OF TROCHANTER OF FEMUR Right 11/28/2021    Procedure: INSERTION, INTRAMEDULLARY LUC, FEMUR, TROCHANTER/RIGHT TFN DR FAJARDO NOTIFIED REP;  Surgeon: Kp Fajardo MD;  Location: [facility] OR;  Service: Orthopedics;  Laterality: Right;  SKIP    ROBOT-ASSISTED LAPAROSCOPIC LYMPHADENECTOMY USING DA NELLA XI N/A 09/21/2020    Procedure: XI ROBOTIC LYMPHADENECTOMY-pelvic and kell-aortic;  Surgeon: Altagracia Ray MD;  Location: [facility] OR;  Service: OB/GYN;  Laterality: N/A;    ROBOT-ASSISTED LAPAROSCOPIC OMENTECTOMY USING DA NELLA XI N/A 09/21/2020    Procedure: XI ROBOTIC OMENTECTOMY;  Surgeon: Altagracia Ray MD;  Location: [facility] OR;   Service: OB/GYN;  Laterality: N/A;    ROBOT-ASSISTED LAPAROSCOPIC SALPINGO-OOPHORECTOMY USING DA NELLA XI Bilateral 09/21/2020    Procedure: XI ROBOTIC SALPINGO-OOPHORECTOMY;  Surgeon: Altagracia Ray MD;  Location: [facility];  Service: OB/GYN;  Laterality: Bilateral;    THORACOSCOPIC BIOPSY OF PLEURA Right 10/31/2023    Procedure: VATS, WITH PLEURA BIOPSY;  Surgeon: Washington Shankar MD;  Location: [facility] OR;  Service: Cardiothoracic;  Laterality: Right;  FROZEN SECTION   **KRISTEN-ASSISDT**    THORACOTOMY Right 10/31/2023    Procedure: THORACOTOMY;  Surgeon: Washington Shankar MD;  Location: [facility] OR;  Service: Cardiothoracic;  Laterality: Right;    UPPER GASTROINTESTINAL ENDOSCOPY         Review of patient's allergies indicates:  No Known Allergies    Current Facility-Administered Medications on File Prior to Encounter   Medication    diphenhydrAMINE injection 25 mg    diphenhydrAMINE injection 25 mg     Current Outpatient Medications on File Prior to Encounter   Medication Sig    amiodarone (PACERONE) 200 MG Tab Take 1 tablet (200 mg total) by mouth once daily.    apixaban (ELIQUIS) 5 mg Tab Take 1 tablet (5 mg total) by mouth 2 (two) times daily. 2 tablets twice a day for a week, then decrease to 1 tablet twice a day    aspirin (ECOTRIN) 81 MG EC tablet Take 81 mg by mouth once daily.    benazepriL (LOTENSIN) 40 MG tablet Take 0.5 tablets (20 mg total) by mouth once daily.    clopidogreL (PLAVIX) 75 mg tablet Take 1 tablet (75 mg total) by mouth once daily.    folic acid (FOLVITE) 1 MG tablet Take 1 tablet (1 mg total) by mouth once daily.    gabapentin (NEURONTIN) 100 MG capsule Take 1 capsule (100 mg total) by mouth 2 (two) times daily.    HYDROcodone-acetaminophen (NORCO) 5-325 mg per tablet Take 1 tablet by mouth every 6 (six) hours as needed for Pain.    LORazepam (ATIVAN) 1 MG tablet Take 1 tablet (1 mg total) by mouth every 6 (six) hours as needed for Anxiety (insomnia).    magnesium oxide (MAG-OX) 400 mg  (241.3 mg magnesium) tablet Take 1 tablet (400 mg total) by mouth once daily. Patient requested refill    metoprolol succinate (TOPROL-XL) 25 MG 24 hr tablet Take 1 tablet (25 mg total) by mouth once daily.    potassium chloride (K-TAB) 20 mEq Take 1 tablet by mouth once daily.    promethazine (PHENERGAN) 25 MG tablet Take 1 tablet (25 mg total) by mouth every 4 (four) hours as needed for Nausea.    VIT A/VIT C/VIT E/ZINC/COPPER (ICAPS AREDS ORAL) Take 1 capsule by mouth 2 (two) times a day.     [DISCONTINUED] melatonin 5 mg Chew Take 1 tablet by mouth nightly as needed (insomnia).    [DISCONTINUED] ondansetron (ZOFRAN) 8 MG tablet Take 1 tablet (8 mg total) by mouth every 8 (eight) hours as needed.    [DISCONTINUED] potassium chloride SA (K-DUR,KLOR-CON) 20 MEQ tablet Take 1 tablet daily     Family History       Problem Relation (Age of Onset)    Breast cancer Maternal Grandmother, Paternal Grandmother    Cancer Mother (57), Father (56)    Colon polyps Daughter    Melanoma Brother    Skin cancer Sister, Brother, Brother          Tobacco Use    Smoking status: Former     Current packs/day: 0.00     Average packs/day: 1 pack/day for 20.0 years (20.0 ttl pk-yrs)     Types: Cigarettes     Start date:      Quit date: 1981     Years since quittin.7    Smokeless tobacco: Never    Tobacco comments:     quit 40 yrs ago   Substance and Sexual Activity    Alcohol use: Yes     Alcohol/week: 1.0 standard drink of alcohol     Types: 1 Glasses of wine per week     Comment: occasional    Drug use: No    Sexual activity: Not Currently     Partners: Male     Review of Systems   Respiratory:          Reports having stage IV lung cancer   Cardiovascular:  Negative for palpitations.   Gastrointestinal:  Positive for abdominal pain, diarrhea, nausea and vomiting.     Objective:     Vital Signs (Most Recent):  Temp: 99 °F (37.2 °C) (10/07/24 1716)  Pulse: (!) 122 (10/07/24 1926)  Resp: 20 (10/07/24 1916)  BP: 131/69 (  & Ruben NP wanted another dose to be given) (10/07/24 2058)  SpO2: 100 % (10/07/24 1926) Vital Signs (24h Range):  Temp:  [97.9 °F (36.6 °C)-99 °F (37.2 °C)] 99 °F (37.2 °C)  Pulse:  [] 122  Resp:  [20-26] 20  SpO2:  [91 %-100 %] 100 %  BP: (124-152)/(69-93) 131/69     Weight: 54.9 kg (121 lb 0.5 oz)  Body mass index is 20.78 kg/m².     Physical Exam  Constitutional:       General: She is awake.      Appearance: She is cachectic.      Interventions: Nasal cannula in place.   HENT:      Head: Normocephalic and atraumatic.   Eyes:      Extraocular Movements: Extraocular movements intact.      Pupils: Pupils are equal, round, and reactive to light.   Cardiovascular:      Rate and Rhythm: Tachycardia present. Rhythm irregular.      Pulses:           Radial pulses are 2+ on the right side and 2+ on the left side.        Posterior tibial pulses are 2+ on the right side and 2+ on the left side.   Pulmonary:      Effort: Accessory muscle usage present.      Breath sounds: Decreased air movement present.   Abdominal:      General: Abdomen is scaphoid.      Palpations: Abdomen is soft.      Tenderness: There is generalized abdominal tenderness.   Musculoskeletal:      Cervical back: Normal range of motion and neck supple.   Skin:     General: Skin is warm.      Capillary Refill: Capillary refill takes less than 2 seconds.   Neurological:      General: No focal deficit present.      Mental Status: She is alert and oriented to person, place, and time. Mental status is at baseline.   Psychiatric:         Attention and Perception: Attention normal.         Mood and Affect: Mood normal.         Speech: Speech normal.         Behavior: Behavior is cooperative.         Thought Content: Thought content normal.         Cognition and Memory: Cognition normal.              CRANIAL NERVES     CN III, IV, VI   Pupils are equal, round, and reactive to light.       Significant Labs: All pertinent labs within the past 24 hours have  been reviewed.  Recent Lab Results  (Last 5 results in the past 24 hours)        10/07/24  1914   10/07/24  1808   10/07/24  1802   10/07/24  1757   10/07/24  1713        Influenza A, Molecular       Negative         Influenza B, Molecular       Negative         Albumin         3.0       ALP         49       ALT         13       Anion Gap         9       Appearance, UA Clear               AST         13       Bacteria, UA Occasional               Baso #         0.01       Basophil %         0.3       Bilirubin (UA) Negative               BILIRUBIN TOTAL         0.5  Comment: For infants and newborns, interpretation of results should be based  on gestational age, weight and in agreement with clinical  observations.    Premature Infant recommended reference ranges:  Up to 24 hours.............<8.0 mg/dL  Up to 48 hours............<12.0 mg/dL  3-5 days..................<15.0 mg/dL  6-29 days.................<15.0 mg/dL         BNP         532  Comment: Values of less than 100 pg/ml are consistent with non-CHF populations.       BUN         28       Calcium         8.9       Chloride         99       CO2         29       Color, UA Yellow               Creatinine         1.2       Differential Method         Automated       eGFR         44.9       Eos #         0.0       Eos %         0.3       Flu A & B Source       Nasal swab         Glucose         109       Glucose, UA Negative               Gran # (ANC)         2.7       Gran %         82.7       Hematocrit         33.8       Hemoglobin         10.1       Hyaline Casts, UA 0               Immature Grans (Abs)         0.05  Comment: Mild elevation in immature granulocytes is non specific and   can be seen in a variety of conditions including stress response,   acute inflammation, trauma and pregnancy. Correlation with other   laboratory and clinical findings is essential.         Immature Granulocytes         1.5       INR         1.1  Comment: Coumadin Therapy:  2.0  - 3.0 for INR for all indicators except mechanical heart valves  and antiphospholipid syndromes which should use 2.5 - 3.5.         Ketones, UA Negative               Leukocyte Esterase, UA Negative               Lipase         40       Lymph #         0.3       Lymph %         9.1       Magnesium          1.8       MCH         30.8       MCHC         29.9       MCV         103       Microscopic Comment SEE COMMENT  Comment: Other formed elements not mentioned in the report are not   present in the microscopic examination.                  Mono #         0.2       Mono %         6.1       MPV         9.3       NITRITE UA Negative               nRBC         0       Blood, UA TRACE               pH, UA 6.0               Phosphorus Level         3.2       Platelet Count         171       POC Creatinine   1.1             POC Lactate     1.01           Potassium         4.1       PROTEIN TOTAL         6.9       Protein, UA 1+  Comment: Recommend a 24 hour urine protein or a urine   protein/creatinine ratio if globulin induced proteinuria is  clinically suspected.                 PT         12.4       RBC         3.28       RBC, UA 2               RDW         18.3       Sample   VENOUS   VENOUS           SARS-CoV-2 RNA, Amplification, Qual       Negative  Comment: This test utilizes isothermal nucleic acid amplification technology   to   detect the SARS-CoV-2 RdRp nucleic acid segment. The analytical   sensitivity   (limit of detection) is 500 copies/swab.     A POSITIVE result is indicative of the presence of SARS-CoV-2 RNA;   clinical   correlation with patient history and other diagnostic information is   necessary to determine patient infection status.    A NEGATIVE result means that SARS-CoV-2 nucleic acids are not present   above   the limit of detection. A NEGATIVE result should be treated as   presumptive.   It does not rule out the possibility of COVID-19 and should not be   the sole   basis for treatment  decisions. If COVID-19 is strongly suspected   based on   clinical and exposure history, re-testing using an alternate   molecular assay   should be considered.     This test is FDA approved.Performance characteristics of this test   has been   independently verified by [facility] in conjunction   with   [facility] Department of Pathology and Laboratory   Medicine.           Sodium         137       Spec Grav UA 1.030               Specimen UA Urine, Clean Catch               Squam Epithel, UA 4               Troponin I High Sensitivity         13.3  Comment: Troponin results differ between methods. Do not use   results between Troponin methods interchangeably.    Alkaline Phospatase levels above 400 U/L may   cause false positive results.    Access hsTnI should not be used for patients taking   Asfotase lizzeth (Strensiq).         TSH         0.876       UROBILINOGEN UA Negative               WBC, UA 2               WBC         3.29                              Significant Imaging: I have reviewed all pertinent imaging results/findings within the past 24 hours.    CTA Chest Abdomen Pelvis  Narrative: EXAMINATION:  CTA CHEST ABDOMEN PELVIS    CLINICAL HISTORY:  mesenteric ischemia eval vs aortic eval;    TECHNIQUE:  Low dose axial images, sagittal and coronal reformations were obtained from the thoracic inlet through the pelvis following the IV administration of 100 mL of Omnipaque 350 .  No oral contrast was administered.    COMPARISON:  Chest radiograph 06/19/2024, CT chest abdomen pelvis 12/10/2023    FINDINGS:  Cardiomegaly.  No pericardial effusion.  No thoracic aortic aneurysm.  No acute pulmonary embolus.    No enlarged axillary or mediastinal nodes.  No enlarged hilar lymph nodes.    Loculated right pleural effusion similar to prior.  New small left pleural effusion.  Basilar predominant consolidation.  Central airways are clear.  Postsurgical changes of right lower lobectomy.   Diffuse ground-glass opacities, interlobular septal thickening and bronchial wall thickening increased compared to prior exam and now involving the left lung.  Bronchocentric consolidation in the right upper lobe.  Pulmonary emphysema.    Liver is normal in morphology and with smooth contour.  Stable right hepatic lobe cyst.    The spleen, pancreas, adrenal glands and kidneys are normal.  No intra or extrahepatic biliary ductal dilatation.  The gallbladder is surgically absent.  Right renal cysts better evaluated on prior CT.    Abdominal aorta with severe atherosclerotic calcification..  No aortic aneurysm or dissection.  The renal arteries and mesenteric arteries are patent.  No enlarged retroperitoneal lymph nodes.    Colonic diverticula.  New mesenteric stranding, worse about the ascending colon.  No bowel dilatation.  No organized fluid collection or free air.    Bladder is smooth walled.  Uterus is absent.  No enlarged pelvic lymph nodes.    Postsurgical changes of right femur ORIF with hardware intact.  Dextroscoliosis of the lumbar spine.  Bilateral breast implants in place.  Impression: Postsurgical changes of right lower lobectomy.  Loculated right pleural effusion similar to prior.  New left pleural effusion and worsening diffuse bilateral airspace opacities interlobular septal thickening, concerning for progression of malignancy and or superimposed pneumonia.    New mesenteric stranding worse along the ascending colon.  Findings could be due to volume overload or infectious/inflammatory colitis.    Severe aortic atheromatous plaque.  No aortic aneurysm or dissection.  The renal and mesenteric arteries are patent.    This report was flagged in Epic as abnormal.    Electronically signed by: Joycelyn Taveras  Date:    10/07/2024  Time:    19:09  X-Ray Chest AP Portable  Narrative: EXAMINATION:  XR CHEST AP PORTABLE    CLINICAL HISTORY:  Sepsis;    TECHNIQUE:  Single frontal view of the chest was  performed.    COMPARISON:  06/19/2024    FINDINGS:  There is a right-sided MediPort catheter in similar position.  Cardiac monitoring leads overlie the chest.  There is stable mild rightward deviation of the trachea.  Cardiac silhouette is stable in size and configuration.  There is aortic atherosclerosis.  There is a large loculated right-sided pleural effusion.  There are diffuse patchy airspace opacities throughout the aerated right lung as well as the left lung which may reflect edema, aspiration, or multifocal infection.  No large volume of left-sided pleural fluid appreciated radiographically.  There are bilateral calcified breast prosthesis present.  No definite pneumothorax identified.  Osseous structures demonstrate degenerative changes.  Impression: Abnormal chest radiograph as above.    Electronically signed by: Virgil Gongora MD  Date:    10/07/2024  Time:    19:05      Assessment/Plan:     * Bilateral pneumonia  Patient has a diagnosis of pneumonia. The cause of the pneumonia is unknown at this time.  Pneumonia superimposed on stage IV lung cancer and immunocompromised patient.  The pneumonia is  initial assessment . The patient has the following signs/symptoms of pneumonia: persistent hypoxia  and shortness of breath. The patient does have a current oxygen requirement and the patient does have a home oxygen requirement. I have reviewed the pertinent imaging. The following cultures have been collected: Sputum culture The culture results are listed below.     Current antimicrobial regimen consists of the antibiotics listed below. Will monitor patient closely and continue current treatment plan unchanged.    Antibiotics (From admission, onward)      Start     Stop Route Frequency Ordered    10/08/24 0900  cefepime 1g in dextrose 5% 100 mL IVPB (ready to mix)         -- IV Every 12 hours (non-standard times) 10/07/24 2118    10/07/24 2215  mupirocin 2 % ointment         10/12/24 2059 Nasl 2 times daily  "10/07/24 2107    10/07/24 2100  cefepime 2 g in dextrose 5% 100 mL IVPB (ready to mix)         -- IV Once 10/07/24 1955    10/07/24 2100  vancomycin in dextrose 5 % 1 gram/250 mL IVPB 1,000 mg         -- IV Once 10/07/24 2000    10/07/24 2054  vancomycin - pharmacy to dose  (vancomycin IVPB (PEDS and ADULTS))        Placed in "And" Linked Group    -- IV pharmacy to manage frequency 10/07/24 1955            Microbiology Results (last 7 days)       Procedure Component Value Units Date/Time    Respiratory Infection Panel (PCR), Nasopharyngeal [[id number]]     Order Status: No result Specimen: Nasopharyngeal Swab     Blood culture x two cultures. Draw prior to antibiotics. [[id number]] Collected: 10/07/24 1753    Order Status: Sent Specimen: Blood from Peripheral, Hand, Left Updated: 10/07/24 1806    Blood culture x two cultures. Draw prior to antibiotics. [[id number]] Collected: 10/07/24 1758    Order Status: Sent Specimen: Blood from Peripheral, Hand, Right Updated: 10/07/24 1806            Atrial fibrillation with RVR  Patient has paroxysmal (<7 days) atrial fibrillation. Patient is currently in atrial fibrillation. [id number] Score: 5. The patients heart rate in the last 24 hours is as follows:  Pulse  Min: 69  Max: 152     Antiarrhythmics  metoprolol succinate (TOPROL-XL) 24 hr tablet 25 mg, Daily, Oral  diltiaZEM injection 13.5 mg, Once, Intravenous  diltiaZEM 100 mg in dextrose 5% 100 mL IVPB (ready to mix) (titrating), Continuous, Intravenous    Anticoagulants  apixaban tablet 5 mg, 2 times daily, Oral    Plan  - Replete lytes with a goal of K>4, Mg >2  - Patient is anticoagulated, see medications listed above.  - Patient's afib is currently uncontrolled. Will adjust treatment as follows:  Start diltiazem weight based bolus with titratable drip .  We will keep Lopressor 5 mg IV push for breakthrough wants patient is at max of 15 milligrams/hour  hold patient's amiodarone continue patient's Eliquis restart p.o. " metoprolol in a.m.          Malignant neoplasm of right lung stage 4  Noted   Pneumonia superimposed of CA      Generalized abdominal pain  With c/o N/V/D since her last treatment  Zofran PRN no bouts of nause/vomiting in ED  No diarrhea while in the ED  Reports slightly better at this time  Morphine 1 mg IV q4h prn     HTN (hypertension)  Patients blood pressure range in the last 24 hours was: BP  Min: 124/70  Max: 152/89.The patient's inpatient anti-hypertensive regimen is listed below:  Current Antihypertensives  lisinopriL tablet 40 mg, Daily, Oral  metoprolol succinate (TOPROL-XL) 24 hr tablet 25 mg, Daily, Oral  diltiaZEM injection 13.5 mg, Once, Intravenous  diltiaZEM 100 mg in dextrose 5% 100 mL IVPB (ready to mix) (titrating), Continuous, Intravenous    Plan  - BP is uncontrolled, will adjust as follows:  See list above holding lisinopril and metoprolol today we will restart p.o. metoprolol in a.m. patient placed on Cardizem drip    Bilateral pleural effusion  Supplemental oxygen   Antibiotic treatments  May improve with conversion of atrial fibrillation holding off on IV diuretic at this time    Oxygen dependent  Continue oxygen as needed      ACP (advance care planning)  Discussed code status with the patient she does not wish to be on the ventilator or have chest compressions.  She was changed to a DNR      CKD stage 3a, GFR 45-59 ml/min  Creatine stable for now. BMP reviewed- noted Estimated Creatinine Clearance: 30.7 mL/min (based on SCr of 1.2 mg/dL). according to latest data. Based on current GFR, CKD stage is stage 3 - GFR 30-59.  Monitor UOP and serial BMP and adjust therapy as needed. Renally dose meds. Avoid nephrotoxic medications and procedures.     Most Recent Today 11 d ago 12 d ago 2 wk ago 3 wk ago 1 mo ago 1 mo ago   CHEM PROFILE   BUN 28 High  (Today) 28 High  20 23 28 High  30 High  21 24 High    Creatinine 1.2 (Today) 1.2 1.2 1.3 1.3 1.2 1.1 1.1   eGFR if non  35.3  Abnormal   (2 yr ago)          eGFR if African American 40.6 Abnormal  (2 yr ago)                VTE Risk Mitigation (From admission, onward)           Ordered     apixaban tablet 5 mg  2 times daily         10/07/24 2029     Reason for No Pharmacological VTE Prophylaxis  Once        Question:  Reasons:  Answer:  Already adequately anticoagulated on oral Anticoagulants    10/07/24 2029     IP VTE HIGH RISK PATIENT  Once         10/07/24 2029     Place sequential compression device  Until discontinued         10/07/24 2029                               Pharmacist Renal Dose Adjustment Note    Alexa Otero is a 83 y.o. female being treated with the medication Cefepime.    Patient Data:    Vital Signs (Most Recent):  Temp: 99 °F (37.2 °C) (10/07/24 1716)  Pulse: (!) 122 (10/07/24 1926)  Resp: 20 (10/07/24 1916)  BP: 131/69 ( & Ruben NP wanted another dose to be given) (10/07/24 2058)  SpO2: 100 % (10/07/24 1926) Vital Signs (72h Range):  Temp:  [97.9 °F (36.6 °C)-99 °F (37.2 °C)]   Pulse:  []   Resp:  [20-26]   BP: (124-152)/(69-93)   SpO2:  [91 %-100 %]      Recent Labs   Lab 10/07/24  1713   CREATININE 1.2     Serum creatinine: 1.2 mg/dL 10/07/24 1713  Estimated creatinine clearance: 30.7 mL/min    Cefepime 1 gram IV every 24 hours will be changed to Cefepime 1 gram IV every 12 hours due to CrCl- 10-50 ml/min.    Pharmacist's Name: Nisreen Floyd  Pharmacist's Extension: [phone]      Kelsy Miranda NP  Department of Hospital Medicine  [facility] - Emergency Dept           0

## 2024-11-12 ENCOUNTER — APPOINTMENT (OUTPATIENT)
Dept: OTOLARYNGOLOGY | Facility: HOSPITAL | Age: 64
End: 2024-11-12
Payer: COMMERCIAL

## 2024-11-12 ENCOUNTER — RESULT REVIEW (OUTPATIENT)
Age: 64
End: 2024-11-12

## 2024-11-12 ENCOUNTER — OUTPATIENT (OUTPATIENT)
Dept: OUTPATIENT SERVICES | Facility: HOSPITAL | Age: 64
LOS: 1 days | Discharge: HOME | End: 2024-11-12
Payer: COMMERCIAL

## 2024-11-12 ENCOUNTER — TRANSCRIPTION ENCOUNTER (OUTPATIENT)
Age: 64
End: 2024-11-12

## 2024-11-12 VITALS
SYSTOLIC BLOOD PRESSURE: 128 MMHG | DIASTOLIC BLOOD PRESSURE: 79 MMHG | OXYGEN SATURATION: 96 % | HEART RATE: 65 BPM | RESPIRATION RATE: 18 BRPM

## 2024-11-12 VITALS
HEART RATE: 53 BPM | TEMPERATURE: 98 F | OXYGEN SATURATION: 99 % | RESPIRATION RATE: 16 BRPM | SYSTOLIC BLOOD PRESSURE: 111 MMHG | HEIGHT: 63 IN | DIASTOLIC BLOOD PRESSURE: 60 MMHG | WEIGHT: 128.09 LBS

## 2024-11-12 DIAGNOSIS — Z98.890 OTHER SPECIFIED POSTPROCEDURAL STATES: Chronic | ICD-10-CM

## 2024-11-12 DIAGNOSIS — Z98.51 TUBAL LIGATION STATUS: Chronic | ICD-10-CM

## 2024-11-12 DIAGNOSIS — C73 MALIGNANT NEOPLASM OF THYROID GLAND: ICD-10-CM

## 2024-11-12 LAB
CALCIUM SERPL-MCNC: 8.8 MG/DL — SIGNIFICANT CHANGE UP (ref 8.4–10.5)
PTH-INTACT IO % DIF SERPL: 28.3 PG/ML — SIGNIFICANT CHANGE UP (ref 19–73)

## 2024-11-12 PROCEDURE — 36415 COLL VENOUS BLD VENIPUNCTURE: CPT

## 2024-11-12 PROCEDURE — C9399: CPT

## 2024-11-12 PROCEDURE — 88342 IMHCHEM/IMCYTCHM 1ST ANTB: CPT

## 2024-11-12 PROCEDURE — 88342 IMHCHEM/IMCYTCHM 1ST ANTB: CPT | Mod: 26

## 2024-11-12 PROCEDURE — 88307 TISSUE EXAM BY PATHOLOGIST: CPT | Mod: 26

## 2024-11-12 PROCEDURE — 83970 ASSAY OF PARATHORMONE: CPT

## 2024-11-12 PROCEDURE — 82310 ASSAY OF CALCIUM: CPT

## 2024-11-12 PROCEDURE — 60252 REMOVAL OF THYROID: CPT

## 2024-11-12 PROCEDURE — 88307 TISSUE EXAM BY PATHOLOGIST: CPT

## 2024-11-12 PROCEDURE — 60252 REMOVAL OF THYROID: CPT | Mod: AS

## 2024-11-12 RX ORDER — APREPITANT 40 MG/1
40 CAPSULE ORAL ONCE
Refills: 0 | Status: COMPLETED | OUTPATIENT
Start: 2024-11-12 | End: 2024-11-12

## 2024-11-12 RX ORDER — ACETAMINOPHEN AND CODEINE PHOSPHATE 300; 30 MG/1; MG/1
1 TABLET ORAL
Qty: 28 | Refills: 0
Start: 2024-11-12 | End: 2024-11-18

## 2024-11-12 RX ORDER — ESCITALOPRAM 10 MG/1
1 TABLET, FILM COATED ORAL
Refills: 0 | DISCHARGE

## 2024-11-12 RX ORDER — CALCITRIOL 0.25 UG/1
1 CAPSULE, LIQUID FILLED ORAL
Refills: 0 | DISCHARGE

## 2024-11-12 RX ORDER — HYDROMORPHONE HCL/0.9% NACL/PF 6 MG/30 ML
0.5 PATIENT CONTROLLED ANALGESIA SYRINGE INTRAVENOUS ONCE
Refills: 0 | Status: DISCONTINUED | OUTPATIENT
Start: 2024-11-12 | End: 2024-11-12

## 2024-11-12 RX ORDER — CALCIUM CARBONATE 400(1000)
2 TABLET,CHEWABLE ORAL
Refills: 0 | DISCHARGE

## 2024-11-12 RX ADMIN — Medication 0.5 MILLIGRAM(S): at 11:35

## 2024-11-12 RX ADMIN — Medication 0.5 MILLIGRAM(S): at 11:20

## 2024-11-12 RX ADMIN — APREPITANT 40 MILLIGRAM(S): 40 CAPSULE ORAL at 08:34

## 2024-11-12 NOTE — ASU DISCHARGE PLAN (ADULT/PEDIATRIC) - PROCEDURE
Total Thyroidectomy with Central Compartment Lymph Node Dissesction Total Thyroidectomy with Central Compartment Lymph Node Dissection

## 2024-11-12 NOTE — ASU DISCHARGE PLAN (ADULT/PEDIATRIC) - ASU DC SPECIAL INSTRUCTIONSFT
Please return to the ED if you develop fevers of 100.4F or greater, intractable pain, intractable vomiting.  Continue to use prescribed pain medications as needed

## 2024-11-12 NOTE — ASU DISCHARGE PLAN (ADULT/PEDIATRIC) - FINANCIAL ASSISTANCE
[facility] provides services at a reduced cost to those who are determined to be eligible through [facility]’s financial assistance program. Information regarding [facility]’s financial assistance program can be found by going to [email or web address] or by calling [phone].

## 2024-11-12 NOTE — ASU DISCHARGE PLAN (ADULT/PEDIATRIC) - CARE PROVIDER_API CALL
Dennis Corbett  Otolaryngology  [address] [place] [address]  Phone: [phone]  Fax: [phone]  Follow Up Time: 1 week

## 2024-11-12 NOTE — BRIEF OPERATIVE NOTE - NSICDXBRIEFPROCEDURE_GEN_ALL_CORE_FT
PROCEDURES:  Total thyroidectomy with limited neck dissection 12-Nov-2024 11:23:46  Dennis Corbett

## 2024-11-18 LAB — SURGICAL PATHOLOGY STUDY: SIGNIFICANT CHANGE UP

## 2024-11-19 DIAGNOSIS — C73 MALIGNANT NEOPLASM OF THYROID GLAND: ICD-10-CM

## 2024-11-19 DIAGNOSIS — Z87.891 PERSONAL HISTORY OF NICOTINE DEPENDENCE: ICD-10-CM

## 2024-11-20 ENCOUNTER — APPOINTMENT (OUTPATIENT)
Dept: OTOLARYNGOLOGY | Facility: CLINIC | Age: 64
End: 2024-11-20
Payer: COMMERCIAL

## 2024-11-20 PROCEDURE — 99024 POSTOP FOLLOW-UP VISIT: CPT

## 2024-12-12 PROBLEM — C73 MALIGNANT NEOPLASM OF THYROID GLAND: Chronic | Status: ACTIVE | Noted: 2024-10-28

## 2024-12-12 PROBLEM — M75.00 ADHESIVE CAPSULITIS OF UNSPECIFIED SHOULDER: Chronic | Status: ACTIVE | Noted: 2024-10-28

## 2024-12-12 PROBLEM — Z97.4 PRESENCE OF EXTERNAL HEARING-AID: Chronic | Status: ACTIVE | Noted: 2024-10-28

## 2024-12-12 PROBLEM — E04.1 NONTOXIC SINGLE THYROID NODULE: Chronic | Status: ACTIVE | Noted: 2024-10-28

## 2024-12-12 PROBLEM — Z86.59 PERSONAL HISTORY OF OTHER MENTAL AND BEHAVIORAL DISORDERS: Chronic | Status: ACTIVE | Noted: 2024-10-28

## 2024-12-12 PROBLEM — H91.90 UNSPECIFIED HEARING LOSS, UNSPECIFIED EAR: Chronic | Status: ACTIVE | Noted: 2024-10-28

## 2025-01-10 ENCOUNTER — RESULT REVIEW (OUTPATIENT)
Age: 65
End: 2025-01-10

## 2025-01-10 ENCOUNTER — OUTPATIENT (OUTPATIENT)
Dept: OUTPATIENT SERVICES | Facility: HOSPITAL | Age: 65
LOS: 1 days | End: 2025-01-10
Payer: COMMERCIAL

## 2025-01-10 DIAGNOSIS — Z12.31 ENCOUNTER FOR SCREENING MAMMOGRAM FOR MALIGNANT NEOPLASM OF BREAST: ICD-10-CM

## 2025-01-10 DIAGNOSIS — Z98.890 OTHER SPECIFIED POSTPROCEDURAL STATES: Chronic | ICD-10-CM

## 2025-01-10 DIAGNOSIS — Z98.51 TUBAL LIGATION STATUS: Chronic | ICD-10-CM

## 2025-01-10 DIAGNOSIS — R92.8 OTHER ABNORMAL AND INCONCLUSIVE FINDINGS ON DIAGNOSTIC IMAGING OF BREAST: ICD-10-CM

## 2025-01-10 PROCEDURE — 77063 BREAST TOMOSYNTHESIS BI: CPT

## 2025-01-10 PROCEDURE — 77067 SCR MAMMO BI INCL CAD: CPT

## 2025-01-10 PROCEDURE — 77067 SCR MAMMO BI INCL CAD: CPT | Mod: 26

## 2025-01-10 PROCEDURE — 77063 BREAST TOMOSYNTHESIS BI: CPT | Mod: 26

## 2025-01-11 DIAGNOSIS — Z12.31 ENCOUNTER FOR SCREENING MAMMOGRAM FOR MALIGNANT NEOPLASM OF BREAST: ICD-10-CM

## 2025-01-16 ENCOUNTER — RESULT REVIEW (OUTPATIENT)
Age: 65
End: 2025-01-16

## 2025-01-16 ENCOUNTER — OUTPATIENT (OUTPATIENT)
Dept: OUTPATIENT SERVICES | Facility: HOSPITAL | Age: 65
LOS: 1 days | End: 2025-01-16
Payer: COMMERCIAL

## 2025-01-16 DIAGNOSIS — Z98.51 TUBAL LIGATION STATUS: Chronic | ICD-10-CM

## 2025-01-16 DIAGNOSIS — R92.8 OTHER ABNORMAL AND INCONCLUSIVE FINDINGS ON DIAGNOSTIC IMAGING OF BREAST: ICD-10-CM

## 2025-01-16 DIAGNOSIS — Z98.890 OTHER SPECIFIED POSTPROCEDURAL STATES: Chronic | ICD-10-CM

## 2025-01-16 PROCEDURE — 77065 DX MAMMO INCL CAD UNI: CPT | Mod: 26,LT

## 2025-01-16 PROCEDURE — 76641 ULTRASOUND BREAST COMPLETE: CPT | Mod: 26,LT

## 2025-01-16 PROCEDURE — G0279: CPT | Mod: 26

## 2025-01-16 PROCEDURE — 77065 DX MAMMO INCL CAD UNI: CPT | Mod: LT

## 2025-01-16 PROCEDURE — G0279: CPT

## 2025-01-16 PROCEDURE — 76641 ULTRASOUND BREAST COMPLETE: CPT | Mod: LT

## 2025-01-17 DIAGNOSIS — R92.8 OTHER ABNORMAL AND INCONCLUSIVE FINDINGS ON DIAGNOSTIC IMAGING OF BREAST: ICD-10-CM

## 2025-06-26 ENCOUNTER — APPOINTMENT (OUTPATIENT)
Facility: CLINIC | Age: 65
End: 2025-06-26